# Patient Record
Sex: FEMALE | Race: WHITE
[De-identification: names, ages, dates, MRNs, and addresses within clinical notes are randomized per-mention and may not be internally consistent; named-entity substitution may affect disease eponyms.]

---

## 2020-10-23 PROBLEM — Z00.00 ENCOUNTER FOR PREVENTIVE HEALTH EXAMINATION: Status: ACTIVE | Noted: 2020-10-23

## 2020-10-30 ENCOUNTER — APPOINTMENT (OUTPATIENT)
Dept: OBGYN | Facility: CLINIC | Age: 50
End: 2020-10-30

## 2020-11-02 ENCOUNTER — APPOINTMENT (OUTPATIENT)
Dept: OBGYN | Facility: CLINIC | Age: 50
End: 2020-11-02
Payer: COMMERCIAL

## 2020-11-02 VITALS
SYSTOLIC BLOOD PRESSURE: 122 MMHG | TEMPERATURE: 98.4 F | WEIGHT: 173 LBS | BODY MASS INDEX: 29.53 KG/M2 | DIASTOLIC BLOOD PRESSURE: 74 MMHG | HEIGHT: 64 IN

## 2020-11-02 DIAGNOSIS — Z82.61 FAMILY HISTORY OF ARTHRITIS: ICD-10-CM

## 2020-11-02 DIAGNOSIS — Z84.1 FAMILY HISTORY OF DISORDERS OF KIDNEY AND URETER: ICD-10-CM

## 2020-11-02 DIAGNOSIS — Z83.2 FAMILY HISTORY OF DISEASES OF THE BLOOD AND BLOOD-FORMING ORGANS AND CERTAIN DISORDERS INVOLVING THE IMMUNE MECHANISM: ICD-10-CM

## 2020-11-02 DIAGNOSIS — Z82.3 FAMILY HISTORY OF STROKE: ICD-10-CM

## 2020-11-02 DIAGNOSIS — Z82.49 FAMILY HISTORY OF ISCHEMIC HEART DISEASE AND OTHER DISEASES OF THE CIRCULATORY SYSTEM: ICD-10-CM

## 2020-11-02 DIAGNOSIS — Z80.6 FAMILY HISTORY OF LEUKEMIA: ICD-10-CM

## 2020-11-02 PROCEDURE — 99072 ADDL SUPL MATRL&STAF TM PHE: CPT

## 2020-11-02 PROCEDURE — 99396 PREV VISIT EST AGE 40-64: CPT

## 2020-11-02 NOTE — DISCUSSION/SUMMARY
[FreeTextEntry1] : Pap done\par Self breast exam stressed\par Prescribed bilateral screening mammogram\par Keep menstrual calendar\par Follow-up yearly or as needed

## 2020-11-02 NOTE — HISTORY OF PRESENT ILLNESS
[FreeTextEntry1] : Patient is 50 years old para 2-0-1-2 last menstrual period October 22, 2020\par She noted her last menstrual period to be approximately 8 days late otherwise her menstrual periods are approximately monthly.\par She is presently without complaints

## 2021-11-08 ENCOUNTER — APPOINTMENT (OUTPATIENT)
Dept: OBGYN | Facility: CLINIC | Age: 51
End: 2021-11-08
Payer: COMMERCIAL

## 2021-11-08 ENCOUNTER — TRANSCRIPTION ENCOUNTER (OUTPATIENT)
Age: 51
End: 2021-11-08

## 2021-11-08 VITALS
BODY MASS INDEX: 50.02 KG/M2 | DIASTOLIC BLOOD PRESSURE: 74 MMHG | HEIGHT: 64 IN | WEIGHT: 293 LBS | SYSTOLIC BLOOD PRESSURE: 116 MMHG

## 2021-11-08 DIAGNOSIS — Z01.419 ENCOUNTER FOR GYNECOLOGICAL EXAMINATION (GENERAL) (ROUTINE) W/OUT ABNORMAL FINDINGS: ICD-10-CM

## 2021-11-08 PROCEDURE — 99396 PREV VISIT EST AGE 40-64: CPT

## 2021-11-08 NOTE — DISCUSSION/SUMMARY
[FreeTextEntry1] : Pap done\par Self breast exam stressed\par Prescribed yearly bilateral screening mammogram\par Keep menstrual calendar\par Issues regarding perimenopause/menopause discussed with patient\par Follow-up yearly or as needed

## 2021-11-08 NOTE — HISTORY OF PRESENT ILLNESS
[FreeTextEntry1] : Patient is 51 years old para 2-0-1-2 last menstrual period September 4, 2021\par Patient states that she has a history of regular menstrual periods every month and just recently skipped a month.\par She denies menopausal symptoms.

## 2023-05-02 ENCOUNTER — NON-APPOINTMENT (OUTPATIENT)
Age: 53
End: 2023-05-02

## 2023-05-05 ENCOUNTER — APPOINTMENT (OUTPATIENT)
Dept: OBGYN | Facility: CLINIC | Age: 53
End: 2023-05-05
Payer: COMMERCIAL

## 2023-05-05 VITALS
DIASTOLIC BLOOD PRESSURE: 76 MMHG | WEIGHT: 182 LBS | HEIGHT: 64 IN | BODY MASS INDEX: 31.07 KG/M2 | SYSTOLIC BLOOD PRESSURE: 118 MMHG

## 2023-05-05 DIAGNOSIS — Z78.9 OTHER SPECIFIED HEALTH STATUS: ICD-10-CM

## 2023-05-05 DIAGNOSIS — Z87.59 PERSONAL HISTORY OF OTHER COMPLICATIONS OF PREGNANCY, CHILDBIRTH AND THE PUERPERIUM: ICD-10-CM

## 2023-05-05 DIAGNOSIS — Z01.419 ENCOUNTER FOR GYNECOLOGICAL EXAMINATION (GENERAL) (ROUTINE) W/OUT ABNORMAL FINDINGS: ICD-10-CM

## 2023-05-05 PROCEDURE — 99396 PREV VISIT EST AGE 40-64: CPT

## 2023-05-05 NOTE — HISTORY OF PRESENT ILLNESS
[FreeTextEntry1] : Patient is 53 years old para 2-0-1-2 last menstrual period March 26, 2023.\par Patient states that her previous menstrual periods were in January 2023 and November 2022.\par She denies pain or heavy bleeding.

## 2023-05-05 NOTE — DISCUSSION/SUMMARY
[FreeTextEntry1] : Pap done\par Self breast exam stressed\par Prescribed bilateral screening mammogram\par Prescribed hormone profile\par Issues regarding perimenopause/irregular menses discussed with patient including possible etiologies, diagnostic and treatment options.\par Follow-up yearly or as needed

## 2023-08-31 RX ORDER — MISOPROSTOL 200 UG/1
200 TABLET ORAL
Qty: 2 | Refills: 0 | Status: ACTIVE | COMMUNITY
Start: 2023-08-31 | End: 1900-01-01

## 2023-09-05 ENCOUNTER — APPOINTMENT (OUTPATIENT)
Dept: OBGYN | Facility: CLINIC | Age: 53
End: 2023-09-05
Payer: COMMERCIAL

## 2023-09-05 VITALS — DIASTOLIC BLOOD PRESSURE: 74 MMHG | HEIGHT: 64 IN | SYSTOLIC BLOOD PRESSURE: 116 MMHG

## 2023-09-05 LAB
HCG UR QL: NEGATIVE
QUALITY CONTROL: YES

## 2023-09-05 PROCEDURE — 81002 URINALYSIS NONAUTO W/O SCOPE: CPT

## 2023-09-05 PROCEDURE — 58100 BIOPSY OF UTERUS LINING: CPT

## 2023-09-05 NOTE — PROCEDURE
[Endometrial Biopsy] : Endometrial biopsy [Time out performed] : Pre-procedure time out performed.  Patient's name, date of birth and procedure confirmed. [Consent Obtained] : Consent obtained [Thickened Endometrium] : thickened endometrium [Risks] : risks [Benefits] : benefits [Alternatives] : alternatives [Patient] : patient [Infection] : infection [Bleeding] : bleeding [Allergic Reaction] : allergic reaction [Uterine Perforation] : uterine perforation [Pain] : pain [Negative] : negative pregnancy test [Ibuprofen ___ mg] : ibuprofen [unfilled] ~Umg [Cytotec] : Cytotec [Betadine] : Betadine [None] : none [Tenaculum] : Tenaculum [Easy Passage] : Easy passage [Sounded to ___ cm] : sounded to [unfilled] ~Ucm [Anteverted] : anteverted [Moderate] : moderate [Specimen Collected] : collected [Sent to Pathology] : placed in buffered formalin and sent for pathology [Tolerated Well] : Patient tolerated the procedure well [No Complications] : No complications [LMPDate] : 08/15/2023

## 2023-09-29 ENCOUNTER — OUTPATIENT (OUTPATIENT)
Dept: OUTPATIENT SERVICES | Facility: HOSPITAL | Age: 53
LOS: 1 days | End: 2023-09-29
Payer: COMMERCIAL

## 2023-09-29 VITALS
HEART RATE: 70 BPM | HEIGHT: 64 IN | SYSTOLIC BLOOD PRESSURE: 150 MMHG | DIASTOLIC BLOOD PRESSURE: 74 MMHG | OXYGEN SATURATION: 100 % | RESPIRATION RATE: 18 BRPM | WEIGHT: 171.96 LBS | TEMPERATURE: 98 F

## 2023-09-29 DIAGNOSIS — Z98.890 OTHER SPECIFIED POSTPROCEDURAL STATES: Chronic | ICD-10-CM

## 2023-09-29 DIAGNOSIS — Z01.818 ENCOUNTER FOR OTHER PREPROCEDURAL EXAMINATION: ICD-10-CM

## 2023-09-29 DIAGNOSIS — N84.0 POLYP OF CORPUS UTERI: ICD-10-CM

## 2023-09-29 DIAGNOSIS — E11.9 TYPE 2 DIABETES MELLITUS WITHOUT COMPLICATIONS: Chronic | ICD-10-CM

## 2023-09-29 LAB
ALBUMIN SERPL ELPH-MCNC: 4.6 G/DL — SIGNIFICANT CHANGE UP (ref 3.5–5.2)
ALP SERPL-CCNC: 71 U/L — SIGNIFICANT CHANGE UP (ref 30–115)
ALT FLD-CCNC: 9 U/L — SIGNIFICANT CHANGE UP (ref 0–41)
ANION GAP SERPL CALC-SCNC: 12 MMOL/L — SIGNIFICANT CHANGE UP (ref 7–14)
APTT BLD: 33.2 SEC — SIGNIFICANT CHANGE UP (ref 27–39.2)
AST SERPL-CCNC: 12 U/L — SIGNIFICANT CHANGE UP (ref 0–41)
BASOPHILS # BLD AUTO: 0.06 K/UL — SIGNIFICANT CHANGE UP (ref 0–0.2)
BASOPHILS NFR BLD AUTO: 0.6 % — SIGNIFICANT CHANGE UP (ref 0–1)
BILIRUB SERPL-MCNC: 0.9 MG/DL — SIGNIFICANT CHANGE UP (ref 0.2–1.2)
BUN SERPL-MCNC: 10 MG/DL — SIGNIFICANT CHANGE UP (ref 10–20)
CALCIUM SERPL-MCNC: 9.3 MG/DL — SIGNIFICANT CHANGE UP (ref 8.4–10.5)
CHLORIDE SERPL-SCNC: 100 MMOL/L — SIGNIFICANT CHANGE UP (ref 98–110)
CO2 SERPL-SCNC: 26 MMOL/L — SIGNIFICANT CHANGE UP (ref 17–32)
CREAT SERPL-MCNC: 0.6 MG/DL — LOW (ref 0.7–1.5)
EGFR: 107 ML/MIN/1.73M2 — SIGNIFICANT CHANGE UP
EOSINOPHIL # BLD AUTO: 0.07 K/UL — SIGNIFICANT CHANGE UP (ref 0–0.7)
EOSINOPHIL NFR BLD AUTO: 0.7 % — SIGNIFICANT CHANGE UP (ref 0–8)
GLUCOSE SERPL-MCNC: 88 MG/DL — SIGNIFICANT CHANGE UP (ref 70–99)
HCT VFR BLD CALC: 44.8 % — SIGNIFICANT CHANGE UP (ref 37–47)
HGB BLD-MCNC: 14.5 G/DL — SIGNIFICANT CHANGE UP (ref 12–16)
IMM GRANULOCYTES NFR BLD AUTO: 0.3 % — SIGNIFICANT CHANGE UP (ref 0.1–0.3)
INR BLD: 1 RATIO — SIGNIFICANT CHANGE UP (ref 0.65–1.3)
LYMPHOCYTES # BLD AUTO: 2.02 K/UL — SIGNIFICANT CHANGE UP (ref 1.2–3.4)
LYMPHOCYTES # BLD AUTO: 20.7 % — SIGNIFICANT CHANGE UP (ref 20.5–51.1)
MCHC RBC-ENTMCNC: 28.3 PG — SIGNIFICANT CHANGE UP (ref 27–31)
MCHC RBC-ENTMCNC: 32.4 G/DL — SIGNIFICANT CHANGE UP (ref 32–37)
MCV RBC AUTO: 87.3 FL — SIGNIFICANT CHANGE UP (ref 81–99)
MONOCYTES # BLD AUTO: 0.64 K/UL — HIGH (ref 0.1–0.6)
MONOCYTES NFR BLD AUTO: 6.6 % — SIGNIFICANT CHANGE UP (ref 1.7–9.3)
NEUTROPHILS # BLD AUTO: 6.95 K/UL — HIGH (ref 1.4–6.5)
NEUTROPHILS NFR BLD AUTO: 71.1 % — SIGNIFICANT CHANGE UP (ref 42.2–75.2)
NRBC # BLD: 0 /100 WBCS — SIGNIFICANT CHANGE UP (ref 0–0)
PLATELET # BLD AUTO: 272 K/UL — SIGNIFICANT CHANGE UP (ref 130–400)
PMV BLD: 11.9 FL — HIGH (ref 7.4–10.4)
POTASSIUM SERPL-MCNC: 4.5 MMOL/L — SIGNIFICANT CHANGE UP (ref 3.5–5)
POTASSIUM SERPL-SCNC: 4.5 MMOL/L — SIGNIFICANT CHANGE UP (ref 3.5–5)
PROT SERPL-MCNC: 7.1 G/DL — SIGNIFICANT CHANGE UP (ref 6–8)
PROTHROM AB SERPL-ACNC: 11.4 SEC — SIGNIFICANT CHANGE UP (ref 9.95–12.87)
RBC # BLD: 5.13 M/UL — SIGNIFICANT CHANGE UP (ref 4.2–5.4)
RBC # FLD: 13.2 % — SIGNIFICANT CHANGE UP (ref 11.5–14.5)
SODIUM SERPL-SCNC: 138 MMOL/L — SIGNIFICANT CHANGE UP (ref 135–146)
WBC # BLD: 9.77 K/UL — SIGNIFICANT CHANGE UP (ref 4.8–10.8)
WBC # FLD AUTO: 9.77 K/UL — SIGNIFICANT CHANGE UP (ref 4.8–10.8)

## 2023-09-29 PROCEDURE — 85025 COMPLETE CBC W/AUTO DIFF WBC: CPT

## 2023-09-29 PROCEDURE — 99214 OFFICE O/P EST MOD 30 MIN: CPT | Mod: 25

## 2023-09-29 PROCEDURE — 36415 COLL VENOUS BLD VENIPUNCTURE: CPT

## 2023-09-29 PROCEDURE — 85730 THROMBOPLASTIN TIME PARTIAL: CPT

## 2023-09-29 PROCEDURE — 80053 COMPREHEN METABOLIC PANEL: CPT

## 2023-09-29 PROCEDURE — 93010 ELECTROCARDIOGRAM REPORT: CPT

## 2023-09-29 PROCEDURE — 85610 PROTHROMBIN TIME: CPT

## 2023-09-29 PROCEDURE — 93005 ELECTROCARDIOGRAM TRACING: CPT

## 2023-09-29 NOTE — H&P PST ADULT - NSICDXFAMILYHX_GEN_ALL_CORE_FT
FAMILY HISTORY:  Father  Still living? No  FH: leukemia, Age at diagnosis: Age Unknown  FH: stroke, Age at diagnosis: Age Unknown    Mother  Still living? No  FH: chronic renal disease, Age at diagnosis: Age Unknown

## 2023-09-29 NOTE — H&P PST ADULT - HISTORY OF PRESENT ILLNESS
Patient is a 53 year old female presenting to PAST in preparation for Hysteroscopy Dilation & Curettage With Myosure on 10/20  under gen anesthesia by Dr. Venegas  PATIENT CURRENTLY DENIES CHEST PAIN  SHORTNESS OF BREATH  PALPITATIONS,  DYSURIA, OR UPPER RESPIRATORY INFECTION IN PAST 2 WEEKS    Anesthesia Alert  NO--Difficult Airway  NO--History of neck surgery or radiation  NO--Limited ROM of neck  NO--History of Malignant hyperthermia  NO--Personal or family history of Pseudocholinesterase deficiency  NO--Prior Anesthesia Complication  NO--Latex Allergy  NO--Loose teeth  NO--History of Rheumatoid Arthritis  NO--TRESSA  NO-- BLEEDING RISK  NO--Other_____    As per patient, this is their complete medical and surgical history, including medications both prescribed or over the counter.  Patient verbalized understanding of instructions and was given the opportunity to ask questions and have them answered.   Patient is a 53 year old female presenting to PAST in preparation for Hysteroscopy Dilation & Curettage With Myosure on 10/20  under gen anesthesia by Dr. Venegas  reports increased vaginal bleeding secondary to endometrial polyp has been advised to have above  PATIENT CURRENTLY DENIES CHEST PAIN  SHORTNESS OF BREATH  PALPITATIONS,  DYSURIA, OR UPPER RESPIRATORY INFECTION IN PAST 2 WEEKS    Anesthesia Alert  NO--Difficult Airway  NO--History of neck surgery or radiation  NO--Limited ROM of neck  NO--History of Malignant hyperthermia  NO--Personal or family history of Pseudocholinesterase deficiency  NO--Prior Anesthesia Complication  NO--Latex Allergy  NO--Loose teeth  NO--History of Rheumatoid Arthritis  NO--TRESSA  NO-- BLEEDING RISK  NO--Other_____    Duke Activity Status Index (DASI) from [employer]  on 9/29/2023      RESULT SUMMARY:  58.2 points  The higher the score (maximum 58.2), the higher the functional status.    9.89 METs        INPUTS:  Take care of self —> 2.75 = Yes  Walk indoors —> 1.75 = Yes  Walk 1&ndash;2 blocks on level ground —> 2.75 = Yes  Climb a flight of stairs or walk up a hill —> 5.5 = Yes  Run a short distance —> 8 = Yes  Do light work around the house —> 2.7 = Yes  Do moderate work around the house —> 3.5 = Yes  Do heavy work around the house —> 8 = Yes  Do yardwork —> 4.5 = Yes  Have sexual relations —> 5.25 = Yes  Participate in moderate recreational activities —> 6 = Yes  Participate in strenuous sports —> 7.5 = Yes      Revised Cardiac Risk Index for Pre-Operative Risk from [employer]  on 9/29/2023      RESULT SUMMARY:  0 points  Class I Risk    3.9 %  30-day risk of death, MI, or cardiac arrest    From [employer] 2017, based on pooled data from 5 high quality external validations (4 prospective). These numbers are higher than those often quoted from the now-outdated original study (Solomon 1999). See Evidence for details.      INPUTS:  Elevated-risk surgery —> 0 = No  History of ischemic heart disease —> 0 = No  History of congestive heart failure —> 0 = No  History of cerebrovascular disease —> 0 = No  Pre-operative treatment with insulin —> 0 = No  Pre-operative creatinine >2 mg/dL / 176.8 µmol/L —> 0 = No      As per patient, this is their complete medical and surgical history, including medications both prescribed or over the counter.  Patient verbalized understanding of instructions and was given the opportunity to ask questions and have them answered.

## 2023-09-30 DIAGNOSIS — Z01.818 ENCOUNTER FOR OTHER PREPROCEDURAL EXAMINATION: ICD-10-CM

## 2023-09-30 DIAGNOSIS — N84.0 POLYP OF CORPUS UTERI: ICD-10-CM

## 2023-10-17 RX ORDER — MISOPROSTOL 200 UG/1
200 TABLET ORAL
Qty: 2 | Refills: 0 | Status: ACTIVE | COMMUNITY
Start: 2023-10-17 | End: 1900-01-01

## 2023-10-20 ENCOUNTER — TRANSCRIPTION ENCOUNTER (OUTPATIENT)
Age: 53
End: 2023-10-20

## 2023-10-20 ENCOUNTER — APPOINTMENT (OUTPATIENT)
Dept: OBGYN | Facility: AMBULATORY SURGERY CENTER | Age: 53
End: 2023-10-20

## 2023-10-20 ENCOUNTER — RESULT REVIEW (OUTPATIENT)
Age: 53
End: 2023-10-20

## 2023-10-20 ENCOUNTER — OUTPATIENT (OUTPATIENT)
Dept: OUTPATIENT SERVICES | Facility: HOSPITAL | Age: 53
LOS: 1 days | Discharge: HOME | End: 2023-10-20
Payer: COMMERCIAL

## 2023-10-20 VITALS
OXYGEN SATURATION: 98 % | HEART RATE: 66 BPM | WEIGHT: 171.96 LBS | RESPIRATION RATE: 20 BRPM | SYSTOLIC BLOOD PRESSURE: 139 MMHG | HEIGHT: 64 IN | TEMPERATURE: 98 F | DIASTOLIC BLOOD PRESSURE: 61 MMHG

## 2023-10-20 VITALS
TEMPERATURE: 98 F | SYSTOLIC BLOOD PRESSURE: 138 MMHG | DIASTOLIC BLOOD PRESSURE: 70 MMHG | HEART RATE: 64 BPM | RESPIRATION RATE: 20 BRPM | OXYGEN SATURATION: 100 %

## 2023-10-20 DIAGNOSIS — Z98.890 OTHER SPECIFIED POSTPROCEDURAL STATES: Chronic | ICD-10-CM

## 2023-10-20 DIAGNOSIS — N84.0 POLYP OF CORPUS UTERI: ICD-10-CM

## 2023-10-20 PROCEDURE — 58558 HYSTEROSCOPY BIOPSY: CPT

## 2023-10-20 PROCEDURE — 88305 TISSUE EXAM BY PATHOLOGIST: CPT

## 2023-10-20 PROCEDURE — 88305 TISSUE EXAM BY PATHOLOGIST: CPT | Mod: 26

## 2023-10-20 RX ORDER — SODIUM CHLORIDE 9 MG/ML
1000 INJECTION, SOLUTION INTRAVENOUS
Refills: 0 | Status: DISCONTINUED | OUTPATIENT
Start: 2023-10-20 | End: 2023-10-20

## 2023-10-20 RX ORDER — ONDANSETRON 8 MG/1
4 TABLET, FILM COATED ORAL ONCE
Refills: 0 | Status: DISCONTINUED | OUTPATIENT
Start: 2023-10-20 | End: 2023-10-20

## 2023-10-20 RX ORDER — HYDROMORPHONE HYDROCHLORIDE 2 MG/ML
0.5 INJECTION INTRAMUSCULAR; INTRAVENOUS; SUBCUTANEOUS
Refills: 0 | Status: DISCONTINUED | OUTPATIENT
Start: 2023-10-20 | End: 2023-10-20

## 2023-10-20 RX ADMIN — SODIUM CHLORIDE 75 MILLILITER(S): 9 INJECTION, SOLUTION INTRAVENOUS at 10:59

## 2023-10-20 NOTE — ASU DISCHARGE PLAN (ADULT/PEDIATRIC) - NS MD DC FALL RISK RISK
For information on Fall & Injury Prevention, visit: [email or web address] OR  [email or web address] OR  https://www.cdc.gov/steadi/patient.html

## 2023-10-20 NOTE — ASU DISCHARGE PLAN (ADULT/PEDIATRIC) - CARE PROVIDER_API CALL
Luis Cooper  Obstetrics and Gynecology  [address] [place] [id number]  Phone: [phone]  Fax: [phone]  Follow Up Time: 2 weeks

## 2023-10-20 NOTE — BRIEF OPERATIVE NOTE - NSICDXBRIEFPROCEDURE_GEN_ALL_CORE_FT
PROCEDURES:  Hysteroscopy with dilation and curettage of uterus using MyoSure device 20-Oct-2023 10:50:17  Luis Cooper   Yes

## 2023-10-24 LAB
SURGICAL PATHOLOGY STUDY: SIGNIFICANT CHANGE UP
SURGICAL PATHOLOGY STUDY: SIGNIFICANT CHANGE UP

## 2023-10-26 DIAGNOSIS — N84.0 POLYP OF CORPUS UTERI: ICD-10-CM

## 2023-10-26 DIAGNOSIS — Z88.2 ALLERGY STATUS TO SULFONAMIDES: ICD-10-CM

## 2024-02-01 PROBLEM — N84.0 POLYP OF CORPUS UTERI: Chronic | Status: ACTIVE | Noted: 2023-09-29

## 2024-02-23 ENCOUNTER — APPOINTMENT (OUTPATIENT)
Dept: GYNECOLOGIC ONCOLOGY | Facility: CLINIC | Age: 54
End: 2024-02-23
Payer: COMMERCIAL

## 2024-02-23 VITALS
WEIGHT: 155 LBS | SYSTOLIC BLOOD PRESSURE: 144 MMHG | HEART RATE: 60 BPM | HEIGHT: 64 IN | BODY MASS INDEX: 26.46 KG/M2 | DIASTOLIC BLOOD PRESSURE: 73 MMHG

## 2024-02-23 DIAGNOSIS — N92.6 IRREGULAR MENSTRUATION, UNSPECIFIED: ICD-10-CM

## 2024-02-23 DIAGNOSIS — N95.1 MENOPAUSAL AND FEMALE CLIMACTERIC STATES: ICD-10-CM

## 2024-02-23 PROCEDURE — 99204 OFFICE O/P NEW MOD 45 MIN: CPT

## 2024-02-23 NOTE — PLAN
[TextEntry] : EUA TLH/BS and all indicated procedures. Options for surgical management discussed. Procedures offered patient included laparoscopic hysterectomy with bilateral salpingo-oophorectomy, along with possible staging.   The risk benefits and alternative to the recommended treatments were discussed. She was informed about potential complications of surgery including but not limited to bowel, bladder, and ureteral injuries. Infectious morbidity, along with bleeding and thromboembolic events were also discussed.   She showed clear understanding, was given the opportunity to ask questions and is amenable to the above surgical treatment. The patient will be setup for the above procedure in the near future.

## 2024-02-23 NOTE — PHYSICAL EXAM
[Chaperone Present] : A chaperone was present in the examining room during all aspects of the physical examination [TextEntry] : Well - developed, well-nourished female in no acute distress HEENT - wnl Breasts - symmetrical w/o masses or nipple discharge Abdomen - soft, non-tender, +BS Back - no CVA tenderness or spinal tenderness Extremities - w/o clubbing, cyanosis, no lesions noted Neuro - AAOx3  Pelvic Exam External Genitalia - with out lesions Vagina - mucosa atrophic, no lesions noted Cervix - no lesions Uterus -  12 wk size, nontender, mobile Adnexa - no palpable masses or tenderness b/l Rectovaginal - confirmatory

## 2024-02-23 NOTE — HISTORY OF PRESENT ILLNESS
[FreeTextEntry1] : Patient is a 54 year old para 2-0-1-2,  x 2, LMP unknown, patient states she has irregular periods for the past two years. referred by Dr. Cooper for endometrial thickening. She is s/p endometrial biopsy 2023, and D&C 2023, most recent TVS 2023 revealed endometrium 10mm.  Endometrial biopsy : Pathology: Endometrial polyps in a background of secretory endometrium with stromal glandular asynchrony.  D&C 10/23 Pathology: Final Diagnosis Endometrial curettings and polyp: -Fragmented endometrial polyp and proliferative endometrium  TVS 24 IMPRESSION:  Uterine fibroids as described. Endometrium is 10 mm, which abnormally thickened if the patient is postmenopausal.. FINDINGS: COMPARISON: 2023 FINDINGS: . UTERUS: 9.4 cm x 4.9 cm x 6.1 cm. Volume = 147.1 cm3.   Myometrium is diffusely heterogeneous slightly hyperechoic suggestive of adenomyosis.  Fibroids as follows:  -Fibroid 1: - 3.0 cm x 2.3 cm x 2.8 cm.  Posterior mid uterine subserosal more than 50% intramural, previously 3.4 x 2.0 x 3.4 cm. -Fibroid 2: - 2.0 cm x 2.0 cm x 2.5 cm. Posterior subserosal, Previously 1.7 x 1.4 x 2.0 cm.  02. ENDOMETRIUM: 1.0 cm. No discrete mass identified.  03. RIGHT OVARY: Not identified likely due to shadowing from bowel gas 04: LEFT OVARY: Not identified likely due to shadowing from bowel gas  05. ADNEXA/OTHER: No additional findings. 06. FREE FLUID: No free fluid.  Patient presents today, referred by Dr. Cooper for discussion of definitive surgical management.

## 2024-02-23 NOTE — ASSESSMENT
[FreeTextEntry1] :  54 year old para 2-0-1-2,  x 2, with unexplained irregular PMB requesting definitive management. Her most recent TVS 2023 revealed a 10mm endometrial linning.

## 2024-02-23 NOTE — OB HISTORY
[Total Preg ___] : : [unfilled] [Living ___] : [unfilled] (living) [Vaginal ___] : [unfilled] vaginal delivery(s) [Menarche Age ____] : age at menarche was [unfilled] [AB Spont ___] : [unfilled] miscarriage(s)

## 2024-02-23 NOTE — PAST MEDICAL HISTORY
[Menarche Age ____] : age at menarche was [unfilled] [Menopause Age____] : age at menopause was [unfilled] [Irregular Cycle Intervals] : are  irregular [Total Preg ___] : G[unfilled] [Living ___] : Living: [unfilled] [AB Spont ___] : miscarriages: [unfilled]

## 2024-04-01 ENCOUNTER — OUTPATIENT (OUTPATIENT)
Dept: OUTPATIENT SERVICES | Facility: HOSPITAL | Age: 54
LOS: 1 days | End: 2024-04-01
Payer: COMMERCIAL

## 2024-04-01 VITALS
TEMPERATURE: 98 F | WEIGHT: 158.07 LBS | OXYGEN SATURATION: 99 % | RESPIRATION RATE: 18 BRPM | HEART RATE: 67 BPM | HEIGHT: 64 IN | SYSTOLIC BLOOD PRESSURE: 147 MMHG | DIASTOLIC BLOOD PRESSURE: 88 MMHG

## 2024-04-01 DIAGNOSIS — Z98.890 OTHER SPECIFIED POSTPROCEDURAL STATES: Chronic | ICD-10-CM

## 2024-04-01 DIAGNOSIS — R93.89 ABNORMAL FINDINGS ON DIAGNOSTIC IMAGING OF OTHER SPECIFIED BODY STRUCTURES: ICD-10-CM

## 2024-04-01 DIAGNOSIS — Z01.818 ENCOUNTER FOR OTHER PREPROCEDURAL EXAMINATION: ICD-10-CM

## 2024-04-01 LAB
ALBUMIN SERPL ELPH-MCNC: 4.6 G/DL — SIGNIFICANT CHANGE UP (ref 3.5–5.2)
ALP SERPL-CCNC: 91 U/L — SIGNIFICANT CHANGE UP (ref 30–115)
ALT FLD-CCNC: 36 U/L — SIGNIFICANT CHANGE UP (ref 0–41)
ANION GAP SERPL CALC-SCNC: 14 MMOL/L — SIGNIFICANT CHANGE UP (ref 7–14)
AST SERPL-CCNC: 36 U/L — SIGNIFICANT CHANGE UP (ref 0–41)
BASOPHILS # BLD AUTO: 0.05 K/UL — SIGNIFICANT CHANGE UP (ref 0–0.2)
BASOPHILS NFR BLD AUTO: 0.7 % — SIGNIFICANT CHANGE UP (ref 0–1)
BILIRUB SERPL-MCNC: 0.4 MG/DL — SIGNIFICANT CHANGE UP (ref 0.2–1.2)
BLD GP AB SCN SERPL QL: SIGNIFICANT CHANGE UP
BUN SERPL-MCNC: 14 MG/DL — SIGNIFICANT CHANGE UP (ref 10–20)
CALCIUM SERPL-MCNC: 9.4 MG/DL — SIGNIFICANT CHANGE UP (ref 8.4–10.5)
CHLORIDE SERPL-SCNC: 101 MMOL/L — SIGNIFICANT CHANGE UP (ref 98–110)
CO2 SERPL-SCNC: 25 MMOL/L — SIGNIFICANT CHANGE UP (ref 17–32)
CREAT SERPL-MCNC: 0.6 MG/DL — LOW (ref 0.7–1.5)
EGFR: 106 ML/MIN/1.73M2 — SIGNIFICANT CHANGE UP
EOSINOPHIL # BLD AUTO: 0.18 K/UL — SIGNIFICANT CHANGE UP (ref 0–0.7)
EOSINOPHIL NFR BLD AUTO: 2.4 % — SIGNIFICANT CHANGE UP (ref 0–8)
GLUCOSE SERPL-MCNC: 83 MG/DL — SIGNIFICANT CHANGE UP (ref 70–99)
HCT VFR BLD CALC: 43 % — SIGNIFICANT CHANGE UP (ref 37–47)
HGB BLD-MCNC: 14.1 G/DL — SIGNIFICANT CHANGE UP (ref 12–16)
IMM GRANULOCYTES NFR BLD AUTO: 0.4 % — HIGH (ref 0.1–0.3)
LYMPHOCYTES # BLD AUTO: 2.08 K/UL — SIGNIFICANT CHANGE UP (ref 1.2–3.4)
LYMPHOCYTES # BLD AUTO: 27.5 % — SIGNIFICANT CHANGE UP (ref 20.5–51.1)
MCHC RBC-ENTMCNC: 28.7 PG — SIGNIFICANT CHANGE UP (ref 27–31)
MCHC RBC-ENTMCNC: 32.8 G/DL — SIGNIFICANT CHANGE UP (ref 32–37)
MCV RBC AUTO: 87.4 FL — SIGNIFICANT CHANGE UP (ref 81–99)
MONOCYTES # BLD AUTO: 0.49 K/UL — SIGNIFICANT CHANGE UP (ref 0.1–0.6)
MONOCYTES NFR BLD AUTO: 6.5 % — SIGNIFICANT CHANGE UP (ref 1.7–9.3)
NEUTROPHILS # BLD AUTO: 4.72 K/UL — SIGNIFICANT CHANGE UP (ref 1.4–6.5)
NEUTROPHILS NFR BLD AUTO: 62.5 % — SIGNIFICANT CHANGE UP (ref 42.2–75.2)
NRBC # BLD: 0 /100 WBCS — SIGNIFICANT CHANGE UP (ref 0–0)
PLATELET # BLD AUTO: 199 K/UL — SIGNIFICANT CHANGE UP (ref 130–400)
PMV BLD: 12.8 FL — HIGH (ref 7.4–10.4)
POTASSIUM SERPL-MCNC: 4.4 MMOL/L — SIGNIFICANT CHANGE UP (ref 3.5–5)
POTASSIUM SERPL-SCNC: 4.4 MMOL/L — SIGNIFICANT CHANGE UP (ref 3.5–5)
PROT SERPL-MCNC: 7.1 G/DL — SIGNIFICANT CHANGE UP (ref 6–8)
RBC # BLD: 4.92 M/UL — SIGNIFICANT CHANGE UP (ref 4.2–5.4)
RBC # FLD: 13.2 % — SIGNIFICANT CHANGE UP (ref 11.5–14.5)
SODIUM SERPL-SCNC: 140 MMOL/L — SIGNIFICANT CHANGE UP (ref 135–146)
WBC # BLD: 7.55 K/UL — SIGNIFICANT CHANGE UP (ref 4.8–10.8)
WBC # FLD AUTO: 7.55 K/UL — SIGNIFICANT CHANGE UP (ref 4.8–10.8)

## 2024-04-01 PROCEDURE — 93005 ELECTROCARDIOGRAM TRACING: CPT

## 2024-04-01 PROCEDURE — 86900 BLOOD TYPING SEROLOGIC ABO: CPT

## 2024-04-01 PROCEDURE — 36415 COLL VENOUS BLD VENIPUNCTURE: CPT

## 2024-04-01 PROCEDURE — 86901 BLOOD TYPING SEROLOGIC RH(D): CPT

## 2024-04-01 PROCEDURE — 86850 RBC ANTIBODY SCREEN: CPT

## 2024-04-01 PROCEDURE — 80053 COMPREHEN METABOLIC PANEL: CPT

## 2024-04-01 PROCEDURE — 93010 ELECTROCARDIOGRAM REPORT: CPT

## 2024-04-01 PROCEDURE — 85025 COMPLETE CBC W/AUTO DIFF WBC: CPT

## 2024-04-01 PROCEDURE — 99214 OFFICE O/P EST MOD 30 MIN: CPT | Mod: 25

## 2024-04-01 NOTE — H&P PST ADULT - ATTENDING COMMENTS
54 year old para 2-0-1-2,  x 2, with unexplained irregular PMB requesting definitive management. Her most recent TVS 2023 revealed a 10mm endometrial linning.    Assessment  Cervical stenosis (uterine cervix) (622.4) (N88.2)  PMB (postmenopausal bleeding) (627.1) (N95.0)  Thickened endometrium (793.5) (R93.89)    Plan  EUA TLH/BS and all indicated procedures.  Options for surgical management discussed. Procedures offered patient included laparoscopic hysterectomy with bilateral salpingo-oophorectomy, along with possible staging.    The risk benefits and alternative to the recommended treatments were discussed. She was informed about potential complications of surgery including but not limited to bowel, bladder, and ureteral injuries. Infectious morbidity, along with bleeding and thromboembolic events were also discussed.  She showed clear understanding, was given the opportunity to ask questions and is amenable to the above surgical treatment.

## 2024-04-01 NOTE — H&P PST ADULT - NSICDXPASTMEDICALHX_GEN_ALL_CORE_FT
PAST MEDICAL HISTORY:  Endometrial polyp     HLD (hyperlipidemia)     Peripheral arterial disease

## 2024-04-01 NOTE — H&P PST ADULT - HISTORY OF PRESENT ILLNESS
PATIENT CURRENTLY DENIES CHEST PAIN  SHORTNESS OF BREATH  PALPITATIONS,  DYSURIA, OR UPPER RESPIRATORY INFECTION IN PAST 2 WEEKS      Denies travel outside the [place] in the past 30 days  Patient denies any signs or symptoms of COVID 19 and denies contact with known positive individuals.         Anesthesia Alert  NO--Difficult Airway  NO--History of neck surgery or radiation  NO--Limited ROM of neck  NO--History of Malignant hyperthermia  NO--No personal or family history of Pseudocholinesterase deficiency.  NO--Prior Anesthesia Complication  NO--Latex Allergy  NO--Loose teeth  NO--History of Rheumatoid Arthritis  NO--Bleeding risk  NO--TRESSA  NO--Other_____  CLASS II    Revised Cardiac Risk Index for Pre-Operative Risk  RESULT SUMMARY:  0 points  Class I Risk    3.9 %  30-day risk of death, MI, or cardiac arrest    INPUTS:  Elevated-risk surgery —> 0 = No  History of ischemic heart disease —> 0 = No  History of congestive heart failure —> 0 = No  History of cerebrovascular disease —> 0 = No  Pre-operative treatment with insulin —> 0 = No  Pre-operative creatinine >2 mg/dL / 176.8 µmol/L —> 0 = No      Duke Activity Status Index (DASI)   RESULT SUMMARY:  58.2 points  The higher the score (maximum 58.2), the higher the functional status.    9.89 METs    INPUTS:  Take care of self —> 2.75 = Yes  Walk indoors —> 1.75 = Yes  Walk 1&ndash;2 blocks on level ground —> 2.75 = Yes  Climb a flight of stairs or walk up a hill —> 5.5 = Yes  Run a short distance —> 8 = Yes  Do light work around the house —> 2.7 = Yes  Do moderate work around the house —> 3.5 = Yes  Do heavy work around the house —> 8 = Yes  Do yardwork —> 4.5 = Yes  Have sexual relations —> 5.25 = Yes  Participate in moderate recreational activities —> 6 = Yes  Participate in strenuous sports —> 7.5 = Yes      PT DENIES ANY RASHES, ABRASION, OR OPEN WOUNDS OR CUTS    AS PER THE PT, THIS IS HIS/HER COMPLETE MEDICAL AND SURGICAL HX, INCLUDING MEDICATIONS PRESCRIBED AND OVER THE COUNTER    Patient verbalized understanding of instructions and was given the opportunity to ask questions and have them answered.    pt denies any suicidal ideation or thoughts, pt states not a threat to self or others

## 2024-04-01 NOTE — H&P PST ADULT - REASON FOR ADMISSION
55 Y/O F WITH PMHX HLD (NOT ON MEDS), MILD B/L LOWER EXTREMITY ARTERIAL DISEASE (ON ASPIRIN 81 MG 3 X WEEK), SCHEDULED FOR PAST FOR TOTAL LAPAROSCOPIC HYSTERECTOMY, BILATERAL SALPINGO OOPHORECTOMY, ALL OTHER INDICATED PROCEDURES UNDER GA WITH DR VELA ON 4/15/24. PT REPORTS HAVING D&C FOR FIBROID REMOVAL 10/2023. SHE STATES ON RECENT EXAM HER FIBROIDS RETURNED AND UTERINE LINING THICKENED. PT ALSO REPORTS INTERMITTENT SPOTTING AS WELL. SHE DENIES ANY OTHER SYMPTOMS.

## 2024-04-02 DIAGNOSIS — R93.89 ABNORMAL FINDINGS ON DIAGNOSTIC IMAGING OF OTHER SPECIFIED BODY STRUCTURES: ICD-10-CM

## 2024-04-02 DIAGNOSIS — Z01.818 ENCOUNTER FOR OTHER PREPROCEDURAL EXAMINATION: ICD-10-CM

## 2024-04-12 NOTE — ASU PATIENT PROFILE, ADULT - ABILITY TO HEAR (WITH HEARING AID OR HEARING APPLIANCE IF NORMALLY USED):
hearing aid home/Mildly to Moderately Impaired: difficulty hearing in some environments or speaker may need to increase volume or speak distinctly

## 2024-04-15 ENCOUNTER — OUTPATIENT (OUTPATIENT)
Dept: OUTPATIENT SERVICES | Facility: HOSPITAL | Age: 54
LOS: 1 days | Discharge: HOME | End: 2024-04-15
Payer: COMMERCIAL

## 2024-04-15 ENCOUNTER — APPOINTMENT (OUTPATIENT)
Dept: GYNECOLOGIC ONCOLOGY | Facility: HOSPITAL | Age: 54
End: 2024-04-15
Payer: COMMERCIAL

## 2024-04-15 ENCOUNTER — TRANSCRIPTION ENCOUNTER (OUTPATIENT)
Age: 54
End: 2024-04-15

## 2024-04-15 ENCOUNTER — RESULT REVIEW (OUTPATIENT)
Age: 54
End: 2024-04-15

## 2024-04-15 VITALS
SYSTOLIC BLOOD PRESSURE: 138 MMHG | DIASTOLIC BLOOD PRESSURE: 65 MMHG | RESPIRATION RATE: 18 BRPM | HEART RATE: 63 BPM | OXYGEN SATURATION: 100 %

## 2024-04-15 VITALS
DIASTOLIC BLOOD PRESSURE: 72 MMHG | HEART RATE: 79 BPM | OXYGEN SATURATION: 98 % | TEMPERATURE: 98 F | WEIGHT: 154.98 LBS | RESPIRATION RATE: 18 BRPM | HEIGHT: 64 IN | SYSTOLIC BLOOD PRESSURE: 166 MMHG

## 2024-04-15 DIAGNOSIS — N83.8 OTHER NONINFLAMMATORY DISORDERS OF OVARY, FALLOPIAN TUBE AND BROAD LIGAMENT: ICD-10-CM

## 2024-04-15 DIAGNOSIS — D25.9 LEIOMYOMA OF UTERUS, UNSPECIFIED: ICD-10-CM

## 2024-04-15 DIAGNOSIS — R93.89 ABNORMAL FINDINGS ON DIAGNOSTIC IMAGING OF OTHER SPECIFIED BODY STRUCTURES: ICD-10-CM

## 2024-04-15 DIAGNOSIS — N85.8 OTHER SPECIFIED NONINFLAMMATORY DISORDERS OF UTERUS: ICD-10-CM

## 2024-04-15 DIAGNOSIS — N83.02 FOLLICULAR CYST OF LEFT OVARY: ICD-10-CM

## 2024-04-15 DIAGNOSIS — N88.8 OTHER SPECIFIED NONINFLAMMATORY DISORDERS OF CERVIX UTERI: ICD-10-CM

## 2024-04-15 DIAGNOSIS — N95.0 POSTMENOPAUSAL BLEEDING: ICD-10-CM

## 2024-04-15 DIAGNOSIS — Z88.2 ALLERGY STATUS TO SULFONAMIDES: ICD-10-CM

## 2024-04-15 DIAGNOSIS — Z98.890 OTHER SPECIFIED POSTPROCEDURAL STATES: Chronic | ICD-10-CM

## 2024-04-15 DIAGNOSIS — E78.5 HYPERLIPIDEMIA, UNSPECIFIED: ICD-10-CM

## 2024-04-15 DIAGNOSIS — N80.03 ADENOMYOSIS OF THE UTERUS: ICD-10-CM

## 2024-04-15 DIAGNOSIS — Z79.82 LONG TERM (CURRENT) USE OF ASPIRIN: ICD-10-CM

## 2024-04-15 LAB — ABO RH CONFIRMATION: SIGNIFICANT CHANGE UP

## 2024-04-15 PROCEDURE — 57425 LAPAROSCOPY SURG COLPOPEXY: CPT

## 2024-04-15 PROCEDURE — 58572 TLH UTERUS OVER 250 G: CPT

## 2024-04-15 PROCEDURE — 36415 COLL VENOUS BLD VENIPUNCTURE: CPT

## 2024-04-15 PROCEDURE — 88331 PATH CONSLTJ SURG 1 BLK 1SPC: CPT | Mod: 26

## 2024-04-15 PROCEDURE — C1889: CPT

## 2024-04-15 PROCEDURE — 88309 TISSUE EXAM BY PATHOLOGIST: CPT | Mod: 26

## 2024-04-15 PROCEDURE — 88309 TISSUE EXAM BY PATHOLOGIST: CPT

## 2024-04-15 PROCEDURE — C9399: CPT

## 2024-04-15 PROCEDURE — 88331 PATH CONSLTJ SURG 1 BLK 1SPC: CPT

## 2024-04-15 RX ORDER — SODIUM CHLORIDE 9 MG/ML
1000 INJECTION, SOLUTION INTRAVENOUS
Refills: 0 | Status: DISCONTINUED | OUTPATIENT
Start: 2024-04-15 | End: 2024-04-15

## 2024-04-15 RX ORDER — IBUPROFEN 200 MG
1 TABLET ORAL
Qty: 30 | Refills: 0
Start: 2024-04-15

## 2024-04-15 RX ORDER — ACETAMINOPHEN 500 MG
2 TABLET ORAL
Qty: 40 | Refills: 0
Start: 2024-04-15

## 2024-04-15 RX ORDER — OXYCODONE HYDROCHLORIDE 5 MG/1
1 TABLET ORAL
Qty: 8 | Refills: 0
Start: 2024-04-15

## 2024-04-15 RX ORDER — SIMETHICONE 80 MG/1
1 TABLET, CHEWABLE ORAL
Qty: 30 | Refills: 0
Start: 2024-04-15

## 2024-04-15 RX ORDER — MORPHINE SULFATE 50 MG/1
4 CAPSULE, EXTENDED RELEASE ORAL
Refills: 0 | Status: DISCONTINUED | OUTPATIENT
Start: 2024-04-15 | End: 2024-04-15

## 2024-04-15 RX ORDER — ASPIRIN/CALCIUM CARB/MAGNESIUM 324 MG
1 TABLET ORAL
Refills: 0 | DISCHARGE

## 2024-04-15 RX ORDER — SENNA PLUS 8.6 MG/1
1 TABLET ORAL
Qty: 10 | Refills: 0
Start: 2024-04-15

## 2024-04-15 NOTE — ASU DISCHARGE PLAN (ADULT/PEDIATRIC) - NS MD DC FALL RISK RISK
For information on Fall & Injury Prevention, visit: [email or web address] OR  [email or web address] OR  https://www.cdc.gov/steadi/patient.html

## 2024-04-15 NOTE — BRIEF OPERATIVE NOTE - NSICDXBRIEFPREOP_GEN_ALL_CORE_FT
PRE-OP DIAGNOSIS:  Fibroid uterus 15-Apr-2024 14:57:13  Daniela Black  Thickened endometrium 15-Apr-2024 14:57:01  Daniela Black  Postmenopausal bleeding 15-Apr-2024 14:57:07  Daniela Black

## 2024-04-15 NOTE — ASU DISCHARGE PLAN (ADULT/PEDIATRIC) - ACTIVITY LEVEL
6 WEEKS/No heavy lifting/Nothing per rectum/Nothing per vagina/No tub baths/No douching/No tampons/No intercourse

## 2024-04-15 NOTE — ASU DISCHARGE PLAN (ADULT/PEDIATRIC) - ASU DC SPECIAL INSTRUCTIONSFT
DIET  - You may resume your normal diet. Eat a well-balanced diet. You may prefer to eat light meals for the first few days after surgery.  Drink plenty of water (6-8 glasses a day).    - You may take zofran as needed for nausea (this dissolves under your tongue).     ACTIVITY:   - No heavy lifting/pushing/pulling for 6 weeks. Do not lift anything more than 10 lbs (such as laundry, groceries, children, pets), vacuum, push heavy doors or grocery carts, etc, for 6 weeks.  - You may climb stairs as tolerated.  -  Do not put anything in the vagina for at least 6 weeks after surgery unless otherwise instructed by your doctor (including tampons, douching, sexual intercourse, etc).  -  No driving for 1 week after surgery and not while taking narcotic pain medication. Drive defensively when you are ready.  -  Avoid sitting or lying in bed for more than 2 hours at a time while you are awake to reduce your risk of blood clots.    WOUND CARE: You have 4 incisions that are covered with surgical glue (Dermabond).  After 24 hours you may get your incisions wet.  Do not submerge in water (no tub baths or pools), showering is OK.  The Dermabond will loosen from the skin and fall off in 5 to 10 days.  Do not apply any ointments, lotions, creams or tape over the Dermabond.    PAIN MANAGEMENT:   Alternate Tylenol and ibuprofen/Motrin (if you are eligible). Each of these medications can be taken every six hours. Try to stagger them so that you are taking something for pain every three hours (ex. Take Motrin at 12:00, Tylenol at 3:00, Motrin at 6:00, etc.) to maximize pain relief.  - Tylenol – 500mg every 6 hours as needed. The maximum dose of Tylenol is 3000 mg in 24 hours.  - Motrin/Ibuprofen - 600 mg every 6 hours as needed (try to take with food). The maximum dose of Motrin/ibuprofen is 2400mg in 24 hours  - Oxycodone 5mg every 6 hours as needed for severe pain (limit use as this is a narcotic and can cause sedation, nausea and constipation.  -  A warm shower, heating pad, and/or walking may help.    WHAT TO EXPECT AT HOME  - Recovery from surgery is generally 2-4 weeks, but sometimes longer for more strenuous activity. It is normal to be very tired during this time.  - It is normal to have some drainage or a small amount of vaginal bleeding after surgery that would require the use of a light pantiliner. This discharge may last up to 6 weeks. The bleeding and discharge should be light and should have no odor.  - You may experience gas pain, abdominal swelling, or shoulder pain for 24-72 hours after surgery. This is from the carbon dioxide gas put into your abdomen to better visualize your organs. A warm shower, heating pad, and/or walking may help.    WHEN TO CALL YOUR DOCTOR:  - Fever (>100.4°F or 38.0°C) or chills  - Incision problems such as redness, warmth, swelling, or foul smelling drainage.  - Severe nausea or persistent vomiting.  - Bright red vaginal bleeding (soaking >1 pad/hour) or foul smelling vaginal drainage.  - Severe pain not relieved with pain medication.  - Pain with urination, cloudy urine, or foul smelling urine.  - Or if you have any other problems or questions.

## 2024-04-15 NOTE — BRIEF OPERATIVE NOTE - NSICDXBRIEFPROCEDURE_GEN_ALL_CORE_FT
PROCEDURES:  Laparoscopic hysterectomy, total, uterus 250 g or less 15-Apr-2024 14:56:31  Daniela Black  Salpingoophorectomy, bilateral, laparoscopic , at same operative episode 15-Apr-2024 14:56:38  Daniela Black  Colpopexy, uterosacral 15-Apr-2024 14:56:48  Daniela Black  Lysis of pelvic adhesions 15-Apr-2024 14:56:54  Daniela Black

## 2024-04-15 NOTE — ASU DISCHARGE PLAN (ADULT/PEDIATRIC) - CARE PROVIDER_API CALL
Billie Cline  Gynecologic Oncology  [address] [place] [address], [address] [place] [address]  Phone: [phone]  Fax: [phone]  Follow Up Time: 2 weeks

## 2024-04-15 NOTE — BRIEF OPERATIVE NOTE - NSICDXBRIEFPOSTOP_GEN_ALL_CORE_FT
POST-OP DIAGNOSIS:  Fibroid uterus 15-Apr-2024 14:57:15  Daniela Black  Postmenopausal bleeding 15-Apr-2024 14:57:26  Daniela Black  Thickened endometrium 15-Apr-2024 14:57:22  Daniela Black

## 2024-04-15 NOTE — BRIEF OPERATIVE NOTE - OPERATION/FINDINGS
Normal external genitalia, vagina, and cervix.   Normal uterus, tubes, and ovaries. Normal appearing appendix. Some adhesions between bowel and anterior abdominal wall.

## 2024-04-16 ENCOUNTER — NON-APPOINTMENT (OUTPATIENT)
Age: 54
End: 2024-04-16

## 2024-04-23 LAB — SURGICAL PATHOLOGY STUDY: SIGNIFICANT CHANGE UP

## 2024-04-26 ENCOUNTER — APPOINTMENT (OUTPATIENT)
Dept: GYNECOLOGIC ONCOLOGY | Facility: CLINIC | Age: 54
End: 2024-04-26
Payer: COMMERCIAL

## 2024-04-26 VITALS
DIASTOLIC BLOOD PRESSURE: 75 MMHG | SYSTOLIC BLOOD PRESSURE: 129 MMHG | BODY MASS INDEX: 26.46 KG/M2 | HEART RATE: 60 BPM | WEIGHT: 155 LBS | HEIGHT: 64 IN

## 2024-04-26 DIAGNOSIS — Z90.710 ACQUIRED ABSENCE OF BOTH CERVIX AND UTERUS: ICD-10-CM

## 2024-04-26 PROBLEM — E78.5 HYPERLIPIDEMIA, UNSPECIFIED: Chronic | Status: ACTIVE | Noted: 2024-04-01

## 2024-04-26 PROBLEM — I73.9 PERIPHERAL VASCULAR DISEASE, UNSPECIFIED: Chronic | Status: ACTIVE | Noted: 2024-04-01

## 2024-04-26 PROCEDURE — 99024 POSTOP FOLLOW-UP VISIT: CPT

## 2024-04-26 NOTE — ASSESSMENT
[FreeTextEntry1] : Patient is a 54 year old para 2-0-1-2,  x 2, LMP unknown, patient states she has irregular periods for the past two years. referred by Dr. Cooper for endometrial thickening. She is s/p endometrial biopsy 2023, and D&C 2023, most recent TVS 2023 revealed endometrium 10mm.  Patient s/p TLH, BSO, uteruosacral colpopexy on 04/15/24.  Final Pathology: Specimen(s) Submitted Uterus, cervix, bilateral tubes and ovaries  Final Diagnosis Uterus, cervix, bilateral tubes and ovaries, total laparoscopic hysterectomy, bilateral salpingo-oophorectomy: - Uterus: - Endometrium: - Benign endometrium, with stromal breakdown. - No evidence of hyperplasia seen in this material. - Myometrium: - Leiomyoma (benign). - Adenomyosis.  - Cervix: Benign cervix, with focal mild chronic inflammation in superficial stroma, endocervical Nabothian cyst and focal tunnel clusters seen.  - Bilateral tubes: - Left tube: Paratubal cyst seen. - Right tube: No significant histopathologic changes seen. - Completed cross-sections seen in both tubes.  - Bilateral ovaries: - Left ovary: Follical cyst seen. - Right ovary: No significant histopathologic changes seen. Verified by: Sarita Lemons M.D. (Electronic Signature) Reported on: 24 19:50 EDT, [facility],  [address] [place] [address] Phone: [phone]   Fax: [phone]  Patient presents today for initial post op evaluation. She is recovering well.

## 2024-04-26 NOTE — REASON FOR VISIT
[Post Op] : post op visit [de-identified] : 04/15/24 [de-identified] : TLH, BSO, uterosacral colpopexy

## 2024-06-25 ENCOUNTER — APPOINTMENT (OUTPATIENT)
Dept: GYNECOLOGIC ONCOLOGY | Facility: CLINIC | Age: 54
End: 2024-06-25
Payer: COMMERCIAL

## 2024-06-25 DIAGNOSIS — R93.89 ABNORMAL FINDINGS ON DIAGNOSTIC IMAGING OF OTHER SPECIFIED BODY STRUCTURES: ICD-10-CM

## 2024-06-25 DIAGNOSIS — Z90.710 ACQUIRED ABSENCE OF BOTH CERVIX AND UTERUS: ICD-10-CM

## 2024-06-25 DIAGNOSIS — Z90.722 ACQUIRED ABSENCE OF BOTH CERVIX AND UTERUS: ICD-10-CM

## 2024-06-25 DIAGNOSIS — N88.2 STRICTURE AND STENOSIS OF CERVIX UTERI: ICD-10-CM

## 2024-06-25 DIAGNOSIS — Z90.79 ACQUIRED ABSENCE OF BOTH CERVIX AND UTERUS: ICD-10-CM

## 2024-06-25 PROCEDURE — 99024 POSTOP FOLLOW-UP VISIT: CPT

## 2024-06-26 PROBLEM — Z90.710 STATUS POST TOTAL HYSTERECTOMY AND BILATERAL SALPINGO-OOPHORECTOMY: Status: ACTIVE | Noted: 2024-04-26

## 2024-06-26 PROBLEM — R93.89 THICKENED ENDOMETRIUM: Status: RESOLVED | Noted: 2023-09-05 | Resolved: 2024-04-26

## 2024-06-26 PROBLEM — N88.2 CERVICAL STENOSIS (UTERINE CERVIX): Status: ACTIVE | Noted: 2023-08-31

## 2024-06-26 NOTE — ASSESSMENT
[FreeTextEntry1] : Patient is a 54 year old para 2-0-1-2,  x 2, LMP unknown, patient states she has irregular periods for the past two years. referred by Dr. Cooper for endometrial thickening. She is s/p endometrial biopsy 2023, and D&C 2023, most recent TVS 2023 revealed endometrium 10mm.  Patient s/p TLH, BSO, uteruosacral colpopexy on 04/15/24.  Final Pathology: Specimen(s) Submitted Uterus, cervix, bilateral tubes and ovaries  Final Diagnosis Uterus, cervix, bilateral tubes and ovaries, total laparoscopic hysterectomy, bilateral salpingo-oophorectomy: - Uterus: - Endometrium: - Benign endometrium, with stromal breakdown. - No evidence of hyperplasia seen in this material. - Myometrium: - Leiomyoma (benign). - Adenomyosis.  - Cervix: Benign cervix, with focal mild chronic inflammation in superficial stroma, endocervical Nabothian cyst and focal tunnel clusters seen.  - Bilateral tubes: - Left tube: Paratubal cyst seen. - Right tube: No significant histopathologic changes seen. - Completed cross-sections seen in both tubes.  - Bilateral ovaries: - Left ovary: Follical cyst seen. - Right ovary: No significant histopathologic changes seen. Verified by: Sarita Lemons M.D. (Electronic Signature) Reported on: 24 19:50 EDT, [facility],  [address] [place] [address] Phone: [phone] Fax: [phone]  Patient presents today for vaginal cuff check.

## 2024-06-26 NOTE — REASON FOR VISIT
[Post Op] : post op visit [de-identified] : 04/15/24 [de-identified] : TLH, BSO, uterosacral colpopexy

## 2024-09-01 ENCOUNTER — OUTPATIENT (OUTPATIENT)
Dept: OUTPATIENT SERVICES | Facility: HOSPITAL | Age: 54
LOS: 1 days | End: 2024-09-01
Payer: COMMERCIAL

## 2024-09-01 DIAGNOSIS — I25.10 ATHEROSCLEROTIC HEART DISEASE OF NATIVE CORONARY ARTERY WITHOUT ANGINA PECTORIS: ICD-10-CM

## 2024-09-01 DIAGNOSIS — Z98.890 OTHER SPECIFIED POSTPROCEDURAL STATES: Chronic | ICD-10-CM

## 2024-09-01 PROCEDURE — 75571 CT HRT W/O DYE W/CA TEST: CPT | Mod: 26

## 2024-09-01 PROCEDURE — 75571 CT HRT W/O DYE W/CA TEST: CPT

## 2024-09-02 DIAGNOSIS — I25.10 ATHEROSCLEROTIC HEART DISEASE OF NATIVE CORONARY ARTERY WITHOUT ANGINA PECTORIS: ICD-10-CM

## 2024-10-28 ENCOUNTER — APPOINTMENT (OUTPATIENT)
Dept: OBGYN | Facility: CLINIC | Age: 54
End: 2024-10-28
Payer: COMMERCIAL

## 2024-10-28 VITALS
WEIGHT: 152 LBS | SYSTOLIC BLOOD PRESSURE: 145 MMHG | DIASTOLIC BLOOD PRESSURE: 75 MMHG | BODY MASS INDEX: 25.95 KG/M2 | HEIGHT: 64 IN

## 2024-10-28 DIAGNOSIS — Z01.419 ENCOUNTER FOR GYNECOLOGICAL EXAMINATION (GENERAL) (ROUTINE) W/OUT ABNORMAL FINDINGS: ICD-10-CM

## 2024-10-28 PROCEDURE — 99396 PREV VISIT EST AGE 40-64: CPT | Mod: 25

## 2024-10-28 PROCEDURE — 99459 PELVIC EXAMINATION: CPT

## 2024-10-28 RX ORDER — ROSUVASTATIN CALCIUM 5 MG/1
5 TABLET, FILM COATED ORAL
Refills: 0 | Status: ACTIVE | COMMUNITY

## 2024-10-28 RX ORDER — KRILL/OM-3/DHA/EPA/PHOSPHO/AST 1000-230MG
81 CAPSULE ORAL
Refills: 0 | Status: ACTIVE | COMMUNITY

## 2024-11-19 ENCOUNTER — APPOINTMENT (OUTPATIENT)
Dept: ORTHOPEDIC SURGERY | Facility: CLINIC | Age: 54
End: 2024-11-19
Payer: COMMERCIAL

## 2024-11-19 ENCOUNTER — RESULT CHARGE (OUTPATIENT)
Age: 54
End: 2024-11-19

## 2024-11-19 DIAGNOSIS — S63.641A SPRAIN OF METACARPOPHALANGEAL JOINT OF RIGHT THUMB, INITIAL ENCOUNTER: ICD-10-CM

## 2024-11-19 DIAGNOSIS — M65.311 TRIGGER THUMB, RIGHT THUMB: ICD-10-CM

## 2024-11-19 PROCEDURE — 73140 X-RAY EXAM OF FINGER(S): CPT | Mod: RT

## 2024-11-19 PROCEDURE — 99203 OFFICE O/P NEW LOW 30 MIN: CPT | Mod: 25

## 2024-11-19 RX ORDER — IBUPROFEN 800 MG/1
800 TABLET, FILM COATED ORAL
Qty: 28 | Refills: 0 | Status: ACTIVE | COMMUNITY
Start: 2024-11-19 | End: 1900-01-01

## 2025-01-03 ENCOUNTER — EMERGENCY (EMERGENCY)
Facility: HOSPITAL | Age: 55
LOS: 0 days | Discharge: HOME | End: 2025-01-03
Attending: EMERGENCY MEDICINE
Payer: COMMERCIAL

## 2025-01-03 VITALS
OXYGEN SATURATION: 99 % | HEIGHT: 64 IN | RESPIRATION RATE: 18 BRPM | HEART RATE: 66 BPM | TEMPERATURE: 98 F | SYSTOLIC BLOOD PRESSURE: 131 MMHG | WEIGHT: 164.91 LBS | DIASTOLIC BLOOD PRESSURE: 78 MMHG

## 2025-01-03 DIAGNOSIS — Z98.890 OTHER SPECIFIED POSTPROCEDURAL STATES: Chronic | ICD-10-CM

## 2025-01-03 DIAGNOSIS — M54.50 LOW BACK PAIN, UNSPECIFIED: ICD-10-CM

## 2025-01-03 DIAGNOSIS — Z88.2 ALLERGY STATUS TO SULFONAMIDES: ICD-10-CM
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
## 2025-01-03 DIAGNOSIS — S30.0XXA CONTUSION OF LOWER BACK AND PELVIS, INITIAL ENCOUNTER: ICD-10-CM
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
## 2025-01-03 DIAGNOSIS — F17.200 NICOTINE DEPENDENCE, UNSPECIFIED, UNCOMPLICATED: ICD-10-CM

## 2025-01-03 PROCEDURE — 99283 EMERGENCY DEPT VISIT LOW MDM: CPT

## 2025-01-03 PROCEDURE — 99284 EMERGENCY DEPT VISIT MOD MDM: CPT

## 2025-01-03 RX ORDER — METHOCARBAMOL 500 MG/1
1000 TABLET, FILM COATED ORAL ONCE
Refills: 0 | Status: COMPLETED | OUTPATIENT
Start: 2025-01-03 | End: 2025-01-03

## 2025-01-03 RX ORDER — ACETAMINOPHEN 500MG 500 MG/1
975 TABLET, COATED ORAL ONCE
Refills: 0 | Status: COMPLETED | OUTPATIENT
Start: 2025-01-03 | End: 2025-01-03

## 2025-01-03 RX ORDER — METHOCARBAMOL 500 MG/1
1 TABLET, FILM COATED ORAL
Qty: 21 | Refills: 0
Start: 2025-01-03 | End: 2025-01-09

## 2025-01-03 RX ADMIN — METHOCARBAMOL 1000 MILLIGRAM(S): 500 TABLET, FILM COATED ORAL at 09:19

## 2025-01-03 RX ADMIN — ACETAMINOPHEN 500MG 975 MILLIGRAM(S): 500 TABLET, COATED ORAL at 09:19

## 2025-01-03 NOTE — ED PROVIDER NOTE - ATTENDING APP SHARED VISIT CONTRIBUTION OF CARE
Patient complains of low back pain.  She is status post fall.  She is ambulatory in the ED.  Vital signs noted.  There is paralumbar spasm.  There is no vertebral midline tenderness.  There is some tenderness over the SI joint.  There is no sciatic notch tenderness.  Distal neurovascular function is intact.  Symptomatic treatment discussed.

## 2025-01-03 NOTE — ED PROVIDER NOTE - PATIENT PORTAL LINK FT
You can access the FollowMyHealth Patient Portal offered by [facility] by registering at the following website: [email or web address]. By joining [employer]’s FollowMyHealth portal, you will also be able to view your health information using other applications (apps) compatible with our system.

## 2025-01-03 NOTE — ED PROVIDER NOTE - PHYSICAL EXAMINATION
Gen: Alert, NAD, well appearing  Head: NC, AT, PERRL, EOMI, normal lids/conjunctiva  Neck: +supple, no tenderness/meningismus,  Pulm: Bilateral BS, normal resp effort, no wheeze/stridor/retractions  CV: RRR  Abd: soft, NT/ND  Mskel: +tenderness and contusion noted to right lower back. No midline tenderness. +pain to back with changes in positions. No edema/erythema/cyanosis  Skin: no rash, warm/dry  Neuro: AAOx3, no sensory/motor deficits. Ambulatory in ED

## 2025-01-03 NOTE — ED ADULT NURSE NOTE - NSFALLUNIVINTERV_ED_ALL_ED
Bed/Stretcher in lowest position, wheels locked, appropriate side rails in place/Call bell, personal items and telephone in reach/Instruct patient to call for assistance before getting out of bed/chair/stretcher/Non-slip footwear applied when patient is off stretcher/[place] to call system/Physically safe environment - no spills, clutter or unnecessary equipment/Purposeful proactive rounding/Room/bathroom lighting operational, light cord in reach

## 2025-01-03 NOTE — ED ADULT NURSE NOTE - SUICIDE SCREENING QUESTION 2
No
Pt with fever and urinary symptoms was prescribed abx but unable to fill it. Will confirm UTI and treat with abx.

## 2025-01-03 NOTE — ED PROVIDER NOTE - OBJECTIVE STATEMENT
54-year-old female history of high cholesterol complaining of right lower back pain.  Patient slipped and fell at 6:15 this morning injuring her right lower back.  No other injuries.  No incontinence or weakness/numbness to extremities.  Patient has been ambulatory since fall. Took Motrin 600mg at home pta.

## 2025-01-03 NOTE — ED PROVIDER NOTE - NSFOLLOWUPINSTRUCTIONS_ED_ALL_ED_FT
Back Pain    Back pain is very common in adults. The cause of back pain is rarely dangerous and the pain often gets better over time. The cause of your back pain may not be known and may include strain of muscles or ligaments, degeneration of the spinal disks, or arthritis. Occasionally the pain may radiate down your leg(s). Over-the-counter medicines to reduce pain and inflammation are often the most helpful. Stretching and remaining active frequently helps the healing process.     SEEK IMMEDIATE MEDICAL CARE IF YOU HAVE ANY OF THE FOLLOWING SYMPTOMS: bowel or bladder control problems, unusual weakness or numbness in your arms or legs, nausea or vomiting, abdominal pain, fever, dizziness/lightheadedness.    Contusion    A contusion is a deep bruise. Contusions are the result of a blunt injury to tissues and muscle fibers under the skin. The skin overlying the contusion may turn blue, purple, or yellow. Symptoms also include pain and swelling in the injured area. Symptoms should resolve with time.     SEEK IMMEDIATE MEDICAL CARE IF YOU HAVE THE FOLLOWING SYMPTOMS: severe pain, numbness, tingling, pain, weakness, or skin color/temperature change in any part of your body distal to the fracture.

## 2025-01-07 ENCOUNTER — APPOINTMENT (OUTPATIENT)
Dept: ORTHOPEDIC SURGERY | Facility: CLINIC | Age: 55
End: 2025-01-07

## 2025-01-07 DIAGNOSIS — M65.311 TRIGGER THUMB, RIGHT THUMB: ICD-10-CM

## 2025-01-07 PROCEDURE — 20550 NJX 1 TENDON SHEATH/LIGAMENT: CPT | Mod: RT

## 2025-01-07 PROCEDURE — 99203 OFFICE O/P NEW LOW 30 MIN: CPT | Mod: 25

## 2025-02-11 ENCOUNTER — APPOINTMENT (OUTPATIENT)
Dept: ORTHOPEDIC SURGERY | Facility: CLINIC | Age: 55
End: 2025-02-11
Payer: COMMERCIAL

## 2025-02-11 DIAGNOSIS — M65.311 TRIGGER THUMB, RIGHT THUMB: ICD-10-CM

## 2025-02-11 PROCEDURE — 99214 OFFICE O/P EST MOD 30 MIN: CPT

## 2025-03-12 NOTE — ASU PATIENT PROFILE, ADULT - FALL HARM RISK - UNIVERSAL INTERVENTIONS
Bed in lowest position, wheels locked, appropriate side rails in place/Call bell, personal items and telephone in reach/Instruct patient to call for assistance before getting out of bed or chair/Non-slip footwear when patient is out of bed/[place] to call system/Physically safe environment - no spills, clutter or unnecessary equipment/Purposeful Proactive Rounding/Room/bathroom lighting operational, light cord in reach

## 2025-03-13 ENCOUNTER — OUTPATIENT (OUTPATIENT)
Dept: OUTPATIENT SERVICES | Facility: HOSPITAL | Age: 55
LOS: 1 days | Discharge: HOME | End: 2025-03-13
Payer: COMMERCIAL

## 2025-03-13 ENCOUNTER — APPOINTMENT (OUTPATIENT)
Dept: ORTHOPEDIC SURGERY | Facility: AMBULATORY SURGERY CENTER | Age: 55
End: 2025-03-13

## 2025-03-13 ENCOUNTER — TRANSCRIPTION ENCOUNTER (OUTPATIENT)
Age: 55
End: 2025-03-13

## 2025-03-13 VITALS
HEIGHT: 64 IN | TEMPERATURE: 98 F | HEART RATE: 57 BPM | SYSTOLIC BLOOD PRESSURE: 166 MMHG | WEIGHT: 151.9 LBS | RESPIRATION RATE: 17 BRPM | OXYGEN SATURATION: 100 % | DIASTOLIC BLOOD PRESSURE: 70 MMHG

## 2025-03-13 VITALS
DIASTOLIC BLOOD PRESSURE: 67 MMHG | SYSTOLIC BLOOD PRESSURE: 176 MMHG | HEART RATE: 62 BPM | RESPIRATION RATE: 18 BRPM | OXYGEN SATURATION: 100 % | TEMPERATURE: 98 F

## 2025-03-13 DIAGNOSIS — Z98.890 OTHER SPECIFIED POSTPROCEDURAL STATES: Chronic | ICD-10-CM

## 2025-03-13 DIAGNOSIS — M65.311 TRIGGER THUMB, RIGHT THUMB: ICD-10-CM

## 2025-03-13 PROCEDURE — 26055 INCISE FINGER TENDON SHEATH: CPT | Mod: F5

## 2025-03-13 RX ORDER — ROSUVASTATIN CALCIUM 20 MG/1
1 TABLET, FILM COATED ORAL
Refills: 0 | DISCHARGE

## 2025-03-13 NOTE — ASU DISCHARGE PLAN (ADULT/PEDIATRIC) - FINANCIAL ASSISTANCE
[facility] provides services at a reduced cost to those who are determined to be eligible through [facility]’s financial assistance program. Information regarding [facility]’s financial assistance program can be found by going to [email or web address] or by calling [phone].

## 2025-03-19 DIAGNOSIS — M65.311 TRIGGER THUMB, RIGHT THUMB: ICD-10-CM

## 2025-03-19 DIAGNOSIS — Z79.82 LONG TERM (CURRENT) USE OF ASPIRIN: ICD-10-CM

## 2025-03-28 ENCOUNTER — APPOINTMENT (OUTPATIENT)
Dept: ORTHOPEDIC SURGERY | Facility: CLINIC | Age: 55
End: 2025-03-28

## 2025-03-28 PROCEDURE — 99024 POSTOP FOLLOW-UP VISIT: CPT

## 2025-04-04 ENCOUNTER — APPOINTMENT (OUTPATIENT)
Dept: ORTHOPEDIC SURGERY | Facility: CLINIC | Age: 55
End: 2025-04-04
Payer: COMMERCIAL

## 2025-04-04 DIAGNOSIS — M65.311 TRIGGER THUMB, RIGHT THUMB: ICD-10-CM

## 2025-04-04 PROCEDURE — 99024 POSTOP FOLLOW-UP VISIT: CPT

## 2025-04-25 ENCOUNTER — APPOINTMENT (OUTPATIENT)
Dept: ORTHOPEDIC SURGERY | Facility: CLINIC | Age: 55
End: 2025-04-25

## 2025-04-25 DIAGNOSIS — M65.311 TRIGGER THUMB, RIGHT THUMB: ICD-10-CM

## 2025-04-25 PROCEDURE — 99024 POSTOP FOLLOW-UP VISIT: CPT

## 2025-05-06 ENCOUNTER — APPOINTMENT (OUTPATIENT)
Dept: ORTHOPEDIC SURGERY | Facility: CLINIC | Age: 55
End: 2025-05-06
Payer: COMMERCIAL

## 2025-05-06 DIAGNOSIS — M65.311 TRIGGER THUMB, RIGHT THUMB: ICD-10-CM

## 2025-05-06 PROCEDURE — 99024 POSTOP FOLLOW-UP VISIT: CPT
